# Patient Record
Sex: MALE
[De-identification: names, ages, dates, MRNs, and addresses within clinical notes are randomized per-mention and may not be internally consistent; named-entity substitution may affect disease eponyms.]

---

## 2021-10-13 PROBLEM — Z00.00 ENCOUNTER FOR PREVENTIVE HEALTH EXAMINATION: Status: ACTIVE | Noted: 2021-10-13

## 2021-10-14 ENCOUNTER — APPOINTMENT (OUTPATIENT)
Dept: PHYSICAL MEDICINE AND REHAB | Facility: CLINIC | Age: 36
End: 2021-10-14
Payer: COMMERCIAL

## 2021-10-14 VITALS
RESPIRATION RATE: 14 BRPM | HEIGHT: 70 IN | HEART RATE: 71 BPM | DIASTOLIC BLOOD PRESSURE: 81 MMHG | WEIGHT: 200 LBS | BODY MASS INDEX: 28.63 KG/M2 | SYSTOLIC BLOOD PRESSURE: 129 MMHG

## 2021-10-14 DIAGNOSIS — Z78.9 OTHER SPECIFIED HEALTH STATUS: ICD-10-CM

## 2021-10-14 DIAGNOSIS — M47.812 SPONDYLOSIS W/OUT MYELOPATHY OR RADICULOPATHY, CERVICAL REGION: ICD-10-CM

## 2021-10-14 PROCEDURE — 99204 OFFICE O/P NEW MOD 45 MIN: CPT

## 2021-10-14 RX ORDER — MELOXICAM 15 MG/1
15 TABLET ORAL
Qty: 30 | Refills: 1 | Status: ACTIVE | COMMUNITY
Start: 2021-10-14 | End: 1900-01-01

## 2021-10-14 NOTE — ASSESSMENT
[FreeTextEntry1] : 36 y.o. M w/ right sided upper cervical pain radiating to occiput and ipsilateral face/eye suspicious for cervical facet joint syndrome.  I spent most of today's office visit (30 min) discussing possible etiologies, pathogenesis and further diagnostic work-up and early non-operative management.  Will obtain screening C-spine radiographs to r/o upper cervical spondylosis.  No need for MRI C-spine at this time.  Rx meloxicam 15 mg; one tab po qd x 1-2 weeks as tolerated.  Rx P.T. for modalities, gentle ROM, stretching and strengthening exercises.  Will consider referral for C-spine MBBs under fluoroscopy if patient is unable to advance his rehab program.  Pt. is  in agreement with plan.  All questions answered.  RTC 2 weeks for x-ray review.

## 2021-10-14 NOTE — HISTORY OF PRESENT ILLNESS
[FreeTextEntry1] : 36 y.o. M  presents to office w/ c/o neck pain.  Pt. relates h/o work related injury (7/5/21) where he fell onto his right sided injuring neck, right shoulder and right knee.  Pt. states that his pain largely resolved and returned to work.  Then pt. woke up late August 2021 w/ sharp pain upper neck/base of skull which never completely resolved.  Pain can radiate up ipsilateral occiput to forehead and eye.  Pain can dissipate w/i 5 minutes. Denies distal radiation of pain down arm.  Endorses intermittent N/T in hands at night.  Pt. went to chiro x 6 visits w/o significant relief.  Uses home traction unit.  Takes OTC ibuprofen prn.  No recent x-rays C-spine.  No P.T. or injections.

## 2021-10-14 NOTE — PHYSICAL EXAM
[FreeTextEntry1] : NAD\par A&Ox3\par Non-obese\par C-spine ROM: 1 FB chin to sternum; 20 extension w/ pain; 35-40' LR either side w/ pain\par Abrams's: neg\par Lhermitte's: neg\par Spurling's: +/- (right sided neck pain to occiput)\par DTR's: 2+ b/l B/T/Br\par MMT: 5/5 b/l UE including RC\par Sensation: SILT.  No decrement to PP testing C5-T1 dermatomes\par Palpation: right upper cervical paravertebral muscle TTP\par

## 2021-10-28 ENCOUNTER — APPOINTMENT (OUTPATIENT)
Dept: PHYSICAL MEDICINE AND REHAB | Facility: CLINIC | Age: 36
End: 2021-10-28

## 2023-12-20 ENCOUNTER — NON-APPOINTMENT (OUTPATIENT)
Age: 38
End: 2023-12-20

## 2025-02-14 ENCOUNTER — NON-APPOINTMENT (OUTPATIENT)
Age: 40
End: 2025-02-14

## 2025-03-13 ENCOUNTER — NON-APPOINTMENT (OUTPATIENT)
Age: 40
End: 2025-03-13